# Patient Record
Sex: MALE | Race: BLACK OR AFRICAN AMERICAN | NOT HISPANIC OR LATINO
[De-identification: names, ages, dates, MRNs, and addresses within clinical notes are randomized per-mention and may not be internally consistent; named-entity substitution may affect disease eponyms.]

---
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
## 2025-02-03 PROBLEM — Z01.812 ENCOUNTER FOR PREOPERATIVE SCREENING LABORATORY TESTING FOR COVID-19 VIRUS: Status: RESOLVED | Noted: 2024-02-05 | Resolved: 2025-02-03

## 2025-02-03 PROBLEM — Z11.52 ENCOUNTER FOR PREOPERATIVE SCREENING LABORATORY TESTING FOR COVID-19 VIRUS: Status: RESOLVED | Noted: 2024-02-05 | Resolved: 2025-02-03

## 2025-02-03 PROBLEM — L44.2 LICHEN STRIATUS: Status: RESOLVED | Noted: 2019-08-15 | Resolved: 2025-02-03

## 2025-02-03 PROBLEM — L42 PITYRIASIS ROSEA: Status: RESOLVED | Noted: 2020-02-18 | Resolved: 2025-02-03

## 2025-02-05 ENCOUNTER — MED REC SCAN ONLY (OUTPATIENT)
Dept: FAMILY MEDICINE CLINIC | Facility: CLINIC | Age: 20
End: 2025-02-05

## 2025-02-05 ENCOUNTER — OFFICE VISIT (OUTPATIENT)
Dept: FAMILY MEDICINE CLINIC | Facility: CLINIC | Age: 20
End: 2025-02-05
Payer: COMMERCIAL

## 2025-02-05 VITALS
HEIGHT: 69 IN | BODY MASS INDEX: 19.61 KG/M2 | SYSTOLIC BLOOD PRESSURE: 119 MMHG | WEIGHT: 132.38 LBS | DIASTOLIC BLOOD PRESSURE: 76 MMHG | HEART RATE: 66 BPM

## 2025-02-05 DIAGNOSIS — Z11.3 SCREEN FOR STD (SEXUALLY TRANSMITTED DISEASE): ICD-10-CM

## 2025-02-05 DIAGNOSIS — Z00.00 PHYSICAL EXAM: Primary | ICD-10-CM

## 2025-02-05 NOTE — PROGRESS NOTES
2/5/2025  9:16 AM    Satish Peters is a 19 year old male.    Chief complaint(s):   Chief Complaint   Patient presents with    Routine Physical     STD screening labs      HPI:     Satish Peters primary complaint is regarding as above.       Satish Peters is a 19 year old male is here for routine periodic health screening and examination.  His last physical exam was 2 years ago.  His last ECG was none . His last diabetes screening test was 2 years ago and was normal.   His last cholesterol test was 2 year ago and was normal.  Last dentist visit was 3 yrs ago.  He is current with his Td immunization.  He is current with his Flu vaccination.  Patient last colonoscopy was last year, due to abdominal pains. He is not a smoker.  Sex partner 3-4 in his life time.      HISTORY:  Past Medical History:    Acid reflux      Past Surgical History:   Procedure Laterality Date    Colonoscopy N/A 2/5/2024    Procedure: COLONOSCOPY with biopsy;  Surgeon: Dayton Loomis MD;  Location: [facility]      No family history on file.   Social History:   Social History     Socioeconomic History    Marital status: Single   Tobacco Use    Smoking status: Never    Smokeless tobacco: Never   Vaping Use    Vaping status: Never Used   Substance and Sexual Activity    Alcohol use: Never    Drug use: Never    Sexual activity: Yes     Social Drivers of Health     Food Insecurity: No Food Insecurity (2/5/2025)    NCSS - Food Insecurity     Worried About Running Out of Food in the Last Year: No     Ran Out of Food in the Last Year: No   Transportation Needs: No Transportation Needs (2/5/2025)    NCSS - Transportation     Lack of Transportation: No   Housing Stability: Not At Risk (2/5/2025)    NCSS - Housing/Utilities     Has Housing: Yes     Worried About Losing Housing: No     Unable to Get Utilities: No        Immunizations:   Immunization History   Administered Date(s) Administered    Adult 18-64 FLUZONE Intraderm, Influenza  Vaccine,(69676),Split Virus, Flu Clinic 10/20/2008    Covid-19 Vaccine Pfizer 30 mcg/0.3 ml 06/05/2021, 06/28/2021    DTAP 10/31/2005, 01/03/2006, 01/30/2006, 03/13/2006, 10/07/2009    DTAP/HEP B/IPV Combined 10/31/2005    Fluvirin, 3 Years & >, Im 09/30/2010    HEP A,Ped/Adol,(2 Dose) 08/28/2006, 08/28/2006, 03/13/2007, 03/13/2007, 06/05/2008    HEP B 08/14/2005, 10/31/2005, 10/28/2010    HEP B, Ped/Adol 08/13/2005, 03/13/2006, 10/28/2010    Hib, Unspecified Formulation 10/31/2005, 01/03/2006, 03/13/2006, 08/28/2006    Hpv Virus Vaccine 9 Bea Im 08/23/2016, 08/15/2019    IPV 10/31/2005, 01/03/2006, 03/13/2006, 10/07/2009    Influenza 03/13/2007, 10/25/2007, 10/20/2008    Influenza Virus Vaccine, Split 03/13/2007, 10/25/2007, 10/20/2008    MMR 08/28/2006, 10/07/2009    Meningococcal Vaccine 08/15/2022    Meningococcal-Menactra 08/23/2016    Meningococcal-Menveo 10-55 YEARS 08/15/2022    Meningococcal-Menveo 2month-55yr 08/15/2022    Pneumococcal (Prevnar 7) 10/31/2005, 01/03/2006, 03/13/2006, 08/28/2006    Pneumovax 23 10/31/2005, 01/03/2006, 08/28/2006    TDAP 08/23/2016    Tb Intradermal Test 01/31/2011    Varicella 08/08/2006, 08/28/2006, 10/07/2009       Medications (Active prior to today's visit):  Current Outpatient Medications   Medication Sig Dispense Refill    dicyclomine 20 MG Oral Tab Take 1 tablet (20 mg total) by mouth 4 (four) times daily as needed (Abdominal pain). 30 tablet 0    ondansetron (ZOFRAN) 4 mg tablet Take 1 tablet (4 mg total) by mouth every 8 (eight) hours as needed for Nausea. 30 tablet 0       Allergies:  Allergies[1]      ROS:   Review of Systems   Constitutional:  Negative for appetite change, fatigue and fever.   HENT:  Negative for hearing loss and nosebleeds.    Eyes:  Negative for pain and visual disturbance.   Respiratory:  Negative for apnea and shortness of breath.    Cardiovascular:  Negative for chest pain, palpitations and leg swelling.   Gastrointestinal:  Negative for  abdominal pain, blood in stool, constipation, diarrhea, nausea and vomiting.   Endocrine: Negative for polydipsia and polyuria.   Genitourinary:  Negative for decreased urine volume, frequency and hematuria.        No nocturia   Musculoskeletal:  Negative for arthralgias.   Skin:  Negative for rash.   Neurological:  Negative for dizziness, syncope and headaches.   Psychiatric/Behavioral:  Negative for dysphoric mood and sleep disturbance.        PHYSICAL EXAM:   VS: /76   Pulse 66   Ht 5' 9\" (1.753 m)   Wt 132 lb 6.4 oz (60.1 kg)   BMI 19.55 kg/m²     Physical Exam  Vitals reviewed.   Constitutional:       Appearance: Normal appearance.   Cardiovascular:      Rate and Rhythm: Normal rate and regular rhythm.      Heart sounds: No murmur heard.     No gallop.   Abdominal:      General: Bowel sounds are normal. There is no distension.      Palpations: Abdomen is soft. There is no mass.      Tenderness: There is no abdominal tenderness.      Hernia: There is no hernia in the left inguinal area or right inguinal area.   Genitourinary:     Penis: Normal.       Testes: Normal.      Prostate: Normal.      Rectum: Guaiac result negative.   Musculoskeletal:      Cervical back: Neck supple.      Comments: Spinal exam without scoliosis.      Skin:     Comments: No rash   Neurological:      Sensory: No sensory deficit.      Deep Tendon Reflexes:      Reflex Scores:       Patellar reflexes are 2+ on the right side and 2+ on the left side.     Comments: Alert, pleasant male.  No focal neurological dificits.  Normal gait and coordination.   Psychiatric:      Comments: Appropriate affect and misdemeanor.         LABORATORY RESULTS:   No results found for: \"URCOLOR\", \"URCLA\", \"URINELEUK\", \"URINENITRITE\", \"URINEBLOOD\"   Results for orders placed or performed during the hospital encounter of 08/11/24   Comp Metabolic Panel (14)    Collection Time: 08/11/24  8:53 PM   Result Value Ref Range    Glucose 96 70 - 99 mg/dL     Sodium 140 136 - 145 mmol/L    Potassium 4.7 3.5 - 5.1 mmol/L    Chloride 105 98 - 112 mmol/L    CO2 27.0 21.0 - 32.0 mmol/L    Anion Gap 8 0 - 18 mmol/L    BUN 11 9 - 23 mg/dL    Creatinine 1.38 (H) 0.50 - 1.00 mg/dL    BUN/CREA Ratio 8.0 (L) 10.0 - 20.0    Calcium, Total 9.4 8.7 - 10.4 mg/dL    Calculated Osmolality 289 275 - 295 mOsm/kg    eGFR-Cr 76 >=60 mL/min/1.73m2    ALT <7 (L) 10 - 49 U/L    AST 22 <34 U/L    Alkaline Phosphatase 82 52 - 222 U/L    Bilirubin, Total 1.6 (H) 0.3 - 1.2 mg/dL    Total Protein 7.2 5.7 - 8.2 g/dL    Albumin 4.4 3.2 - 4.8 g/dL    Globulin  2.8 2.0 - 3.5 g/dL    A/G Ratio 1.6 1.0 - 2.0   CBC With Differential With Platelet    Collection Time: 08/11/24  8:53 PM   Result Value Ref Range    WBC 6.9 4.0 - 11.0 x10(3) uL    RBC 5.57 4.30 - 5.70 x10(6)uL    HGB 14.1 13.0 - 17.5 g/dL    HCT 42.3 39.0 - 53.0 %    MCV 75.9 (L) 80.0 - 100.0 fL    MCH 25.3 (L) 26.0 - 34.0 pg    MCHC 33.3 31.0 - 37.0 g/dL    RDW-SD 39.8 35.1 - 46.3 fL    RDW 14.6 11.0 - 15.0 %    .0 150.0 - 450.0 10(3)uL    Neutrophil Absolute Prelim 3.75 1.50 - 7.70 x10 (3) uL    Neutrophil Absolute 3.75 1.50 - 7.70 x10(3) uL    Lymphocyte Absolute 1.72 1.50 - 5.00 x10(3) uL    Monocyte Absolute 1.32 (H) 0.10 - 1.00 x10(3) uL    Eosinophil Absolute 0.07 0.00 - 0.70 x10(3) uL    Basophil Absolute 0.05 0.00 - 0.20 x10(3) uL    Immature Granulocyte Absolute 0.02 0.00 - 1.00 x10(3) uL    Neutrophil % 54.2 %    Lymphocyte % 24.8 %    Monocyte % 19.0 %    Eosinophil % 1.0 %    Basophil % 0.7 %    Immature Granulocyte % 0.3 %   Lipase    Collection Time: 08/11/24  8:53 PM   Result Value Ref Range    Lipase 38 12 - 53 U/L   RAINBOW DRAW LAVENDER    Collection Time: 08/11/24  8:53 PM   Result Value Ref Range    Hold Lavender Auto Resulted    POCT Rapid Strep    Collection Time: 08/11/24  9:20 PM   Result Value Ref Range    POCT Rapid Strep Negative Negative       EKG / Spirometry : -     Radiology: No results found.      ASSESSMENT/PLAN:   Assessment   Encounter Diagnoses   Name Primary?    Physical exam Yes    Screen for STD (sexually transmitted disease)          CPE PLAN:    LABS / TEST & ORDERS for today's visit :Blood test(s) ordered today for send out to [facility] lab:  Orders Placed This Encounter   Procedures    CBC With Differential With Platelet    Comp Metabolic Panel (14)    Hemoglobin A1C    Lipid Panel    TSH W Reflex To Free T4    Vitamin D    Urinalysis with Culture Reflex    HIV Ag/Ab Combo    T Pallidum Screening Cascade    HSV 1 & 2 Glycoprotein Specific AB,IGG    Chlamydia/Gc Amplification    In-House; Urine dip.  Test/Procedures done today include: EKG.    IMMUNIZATIONS: none, given today.    RECOMMENDATIONS given include: ANTICIPATORY GUIDANCE  topics covered today include: safety (i.e. seat belts, helmets, sunscreen, protective sports gear ), nutrition (i.e. healthy meals and snacks (i.e. avoid junk food and high-carbohydrate foods); athletic conditioning, fluids; low fat milk, limit to less than 20 oz. a day; dental care ), and Healthy habits& Social competence & Responsibilities: Recommendations on physical activity; exercise daily or at least 3 times a week for 30-60 minutes doing cardiovascular exercise. Encourage to maintain the best physical and dental hygiene possible.  Consider a  if overweight and/or having difficult in staying active; attempt to keep a schedule that includes an adequate sleep and physical exercise / activities Patient educated on doing regular self testicular exam. Patient was educated on sexual transmitted disease. Best to abstain from sexual intercourse until he is ready to form a family. Use of condoms may prevent transmission of infections as well as pregnancy.    FOLLOW-UP: Schedule a follow-up visit in 12 months.          Orders This Visit:  Orders Placed This Encounter   Procedures    CBC With Differential With Platelet    Comp Metabolic Panel (14)     Hemoglobin A1C    Lipid Panel    TSH W Reflex To Free T4    Vitamin D    Urinalysis with Culture Reflex    HIV Ag/Ab Combo    T Pallidum Screening Cascade    HSV 1 & 2 Glycoprotein Specific AB,IGG    Chlamydia/Gc Amplification       Meds This Visit:  Requested Prescriptions      No prescriptions requested or ordered in this encounter       Imaging & Referrals:  None         ABEL ALCOCER MD       [1] No Known Allergies

## 2025-02-28 ENCOUNTER — PATIENT MESSAGE (OUTPATIENT)
Facility: CLINIC | Age: 20
End: 2025-02-28

## 2025-02-28 DIAGNOSIS — Z00.00 HEALTHCARE MAINTENANCE: Primary | ICD-10-CM

## 2025-04-30 ENCOUNTER — TELEPHONE (OUTPATIENT)
Facility: CLINIC | Age: 20
End: 2025-04-30

## 2025-04-30 NOTE — TELEPHONE ENCOUNTER
1st,overdue reminder letter mailed out to patient   Labs order   Orders Placed on 3/5/2025    CBC, Platelet; No Differential

## 2025-05-07 ENCOUNTER — PATIENT MESSAGE (OUTPATIENT)
Facility: CLINIC | Age: 20
End: 2025-05-07

## 2025-05-07 DIAGNOSIS — R10.84 GENERALIZED ABDOMINAL PAIN: ICD-10-CM

## 2025-05-11 ENCOUNTER — PATIENT MESSAGE (OUTPATIENT)
Facility: CLINIC | Age: 20
End: 2025-05-11

## 2025-05-11 DIAGNOSIS — R10.84 GENERALIZED ABDOMINAL PAIN: ICD-10-CM

## 2025-05-12 DIAGNOSIS — R10.84 GENERALIZED ABDOMINAL PAIN: ICD-10-CM

## 2025-05-12 RX ORDER — DICYCLOMINE HCL 20 MG
20 TABLET ORAL 4 TIMES DAILY PRN
Qty: 30 TABLET | Refills: 0 | OUTPATIENT
Start: 2025-05-12

## 2025-05-12 RX ORDER — DICYCLOMINE HCL 20 MG
20 TABLET ORAL 4 TIMES DAILY PRN
Qty: 30 TABLET | Refills: 0 | Status: SHIPPED | OUTPATIENT
Start: 2025-05-12

## 2025-05-12 NOTE — TELEPHONE ENCOUNTER
Requested Prescriptions     Pending Prescriptions Disp Refills    dicyclomine 20 MG Oral Tab 30 tablet 0     Sig: Take 1 tablet (20 mg total) by mouth 4 (four) times daily as needed (Abdominal pain).         LOV 11/14/2023  Last Colonoscopy done 2/05/2024  LR  6/11/2024  Upcoming appt 5/15/2025 w/ Kacie     Patient aware that medication has been refilled       Disp Refills Start End    dicyclomine 20 MG Oral Tab 30 tablet 0 5/12/2025 --    Sig - Route: Take 1 tablet (20 mg total) by mouth 4 (four) times daily as needed (Abdominal pain). - Oral    Sent to pharmacy as: Dicyclomine HCl 20 MG Oral Tablet (Bentyl)    E-Prescribing Status: Receipt confirmed by pharmacy (5/12/2025  7:06 AM CDT)      Associated Diagnoses    Generalized abdominal pain        Pharmacy    [id number] [place], [place] - [address] [facility], [phone], [phone]

## 2025-05-12 NOTE — TELEPHONE ENCOUNTER
Disp Refills Start End    dicyclomine 20 MG Oral Tab 30 tablet 0 5/12/2025 --    Sig - Route: Take 1 tablet (20 mg total) by mouth 4 (four) times daily as needed (Abdominal pain). - Oral    Sent to pharmacy as: Dicyclomine HCl 20 MG Oral Tablet (Bentyl)    E-Prescribing Status: Receipt confirmed by pharmacy (5/12/2025  7:06 AM CDT)        Pharmacy    [id number] - [place], [place] - [address] [facility] [address] [facility], [phone], [phone]

## 2025-05-15 ENCOUNTER — OFFICE VISIT (OUTPATIENT)
Facility: CLINIC | Age: 20
End: 2025-05-15
Payer: COMMERCIAL

## 2025-05-15 ENCOUNTER — LAB ENCOUNTER (OUTPATIENT)
Dept: LAB | Facility: HOSPITAL | Age: 20
End: 2025-05-15
Payer: COMMERCIAL

## 2025-05-15 VITALS
WEIGHT: 125 LBS | HEART RATE: 58 BPM | BODY MASS INDEX: 18.51 KG/M2 | DIASTOLIC BLOOD PRESSURE: 79 MMHG | SYSTOLIC BLOOD PRESSURE: 117 MMHG | HEIGHT: 69 IN

## 2025-05-15 DIAGNOSIS — R10.33 PERIUMBILICAL ABDOMINAL PAIN: ICD-10-CM

## 2025-05-15 DIAGNOSIS — R10.10 UPPER ABDOMINAL PAIN: ICD-10-CM

## 2025-05-15 DIAGNOSIS — K21.9 GASTROESOPHAGEAL REFLUX DISEASE, UNSPECIFIED WHETHER ESOPHAGITIS PRESENT: Primary | ICD-10-CM

## 2025-05-15 DIAGNOSIS — K21.9 GASTROESOPHAGEAL REFLUX DISEASE, UNSPECIFIED WHETHER ESOPHAGITIS PRESENT: ICD-10-CM

## 2025-05-15 LAB — IGA SERPL-MCNC: 169.1 MG/DL (ref 40–350)

## 2025-05-15 PROCEDURE — 99214 OFFICE O/P EST MOD 30 MIN: CPT

## 2025-05-15 PROCEDURE — 83013 H PYLORI (C-13) BREATH: CPT

## 2025-05-15 PROCEDURE — 3078F DIAST BP <80 MM HG: CPT

## 2025-05-15 PROCEDURE — 3074F SYST BP LT 130 MM HG: CPT

## 2025-05-15 PROCEDURE — 82784 ASSAY IGA/IGD/IGG/IGM EACH: CPT

## 2025-05-15 PROCEDURE — 36415 COLL VENOUS BLD VENIPUNCTURE: CPT

## 2025-05-15 PROCEDURE — 3008F BODY MASS INDEX DOCD: CPT

## 2025-05-15 PROCEDURE — 86364 TISS TRNSGLTMNASE EA IG CLAS: CPT

## 2025-05-15 NOTE — H&P
[facility] - Gastroenterology                                                                                                               Reason for consult: abd pain    Requesting physician or provider: Sanford Li MD    Chief Complaint   Patient presents with    Abdominal Pain       HPI:   Satish Peters is a 19 year old year-old male with active diagnoses including GERD. Prior medical/surgical history in note table.    he is here today for evaluation. Over the past year he has been feeling well.   #abdominal pain   #dyspepsia  #GERD  -reports episodes of periumbilical abdominal pain & acid reflux triggered by \"junk food\" such as fried foods, greasy foods, excess dairy. Occasional pain in RUQ.   -usually episodes last about 2 days and treated with bland diet, tums and dicyclomine. Most recent episode 1.5 weeks ago and associated with nausea, lasted about 1 week which was abnormal for him.   -diet recall: breakfast: oatmeal, nature valley bar, water // lunch: sandwiches, or leftovers, sometimes take out // dinner: 50/50 homemade or take out. // snack: banana, strawberries // drinks: mostly just water. Does not eat much veggies. Has cut back on juice  -baseline bowel habits usually twice daily, formed, soft to firm. Sometimes wakes up in the morning with urgency. Infrequent loose stool     Patient denies symptoms of vomiting, dysphagia, odynophagia, globus sensation, hematemesis, change in bowel habits, constipation, diarrhea, hematochezia, or melena. he denies recent change in appetite, fever or unintentional weight loss.      Last colonoscopy:  2/5/24 Dr. Loomis  No polyps.  Normal appearing terminal ileum. Biopsied.  Normal appearing colon with glistening mucosa and intact vascular pattern. Biopsied.  Small internal hemorrhoids.  I suspect prior episodes of loose stools was related to a viral gastroenteritis.  Intermittent episodes of blood may be related to hemorrhoids. No evidence of inflammatory bowel disease seen on today's exam.    A. Terminal ileum; biopsy:  Fragments of small bowel mucosa with lamina propria prominent benign lymphoid aggregate.  Normal villous architecture present.  No evidence of acute cryptitis, granuloma, dysplasia, or malignancy is identified.     B. Random colon; biopsy:   Multiple fragments of colonic mucosa with mild nonspecific chronic inflammation, and lamina propria benign lymphoid aggregates.  No evidence of microscopic colitis, significant glandular distortion, acute cryptitis, granuloma, dysplasia, or malignancy is identified.    Last EGD: none     NSAIDS: none   Tobacco: none   Alcohol: none   Marijuana: none   Illicit drugs: none     FH GI malignancy: none   FH IBD: Mom-crohns    No history of adverse reaction to sedation  No SYEDA  No anticoagulants/antiplatelet  No pacemaker/defibrillator    Wt Readings from Last 6 Encounters:   05/15/25 125 lb (56.7 kg) (7%, Z= -1.49)*   02/05/25 132 lb 6.4 oz (60.1 kg) (15%, Z= -1.02)*   08/11/24 130 lb 1.1 oz (59 kg) (14%, Z= -1.07)*   04/24/24 136 lb (61.7 kg) (24%, Z= -0.70)*   02/05/24 131 lb (59.4 kg) (18%, Z= -0.92)*   11/14/23 128 lb (58.1 kg) (15%, Z= -1.04)*     * Growth percentiles are based on [facility] (Boys, 2-20 Years) data.        History, Medications, Allergies, ROS:      Past Medical History[1]   Past Surgical History[2]   Family Hx: Family History[3]   Social History: Short Social Hx on File[4]     Medications (Active prior to today's visit):  Current Medications[5]    Allergies:  Allergies[6]    ROS:   CONSTITUTIONAL: negative for fevers, chills, sweats  EYES Negative for scleral icterus or redness, and diplopia  HEENT: Negative for hoarseness  RESPIRATORY: Negative for cough and severe shortness of breath  CARDIOVASCULAR: Negative for crushing sub-sternal chest pain  GASTROINTESTINAL: See HPI  GENITOURINARY: Negative for  dysuria  MUSCULOSKELETAL: Negative for arthralgias and myalgias  SKIN: Negative for jaundice, rash or pruritus  NEUROLOGICAL: Negative for dizziness and headaches  BEHAVIOR/PSYCH: Negative for psychotic behavior    PHYSICAL EXAM:   Blood pressure 117/79, pulse 58, height 5' 9\" (1.753 m), weight 125 lb (56.7 kg).    GEN: Alert, no acute distress, well-nourished   HEENT: anicteric sclera, neck supple, trachea midline, MMM, no palpable or tender neck or supraclavicular lymph nodes  CV: RRR, the extremities are warm and well perfused   LUNGS: No increased work of breathing, CTAB  ABDOMEN: Soft, symmetrical, non-tender without distention or guarding. No scars or lesions. Aorta is without bruit or visible pulsation. Umbilicus is midline without herniation. Normoactive bowel sounds are present, No masses, hepatomegaly or splenomegaly noted.  MSK: No erythema, no warmth, no swelling of joints  SKIN: No jaundice, no erythema, no rashes, no lesions  HEMATOLOGIC: No bleeding, no bruising  NEURO: Alert and interactive, ESCOBAR  PSYCH: appropriate mood & affect    Labs/Imaging/Procedures:     Patient's pertinent labs and imaging were reviewed and discussed with patient today.        .  ASSESSMENT/PLAN:   Satish Peters is a 19 year old year-old male with active diagnoses including GERD. Prior medical/surgical history in note table.    he is here today for evaluation. Over the past year he has been feeling well.   #abdominal pain   #dyspepsia  #GERD  -reports episodes of periumbilical abdominal pain & acid reflux triggered by \"junk food\" such as fried foods, greasy foods, excess dairy. Occasional pain in RUQ.   -usually episodes last about 2 days and treated with bland diet, tums and dicyclomine. Most recent episode 1.5 weeks ago and associated with nausea, lasted about 1 week which was abnormal for him.   -diet recall: breakfast: oatmeal, nature [place] bar, water // lunch: sandwiches, or leftovers, sometimes take out // dinner: 50/50  homemade or take out. // snack: banana, strawberries // drinks: mostly just water. Does not eat much veggies. Has cut back on juice  -baseline bowel habits usually twice daily, formed, soft to firm. Sometimes wakes up in the morning with urgency. Infrequent loose stool   -colonoscopy in 2024 negative for IBD    -symptoms likely GERD related triggered by diet. Advised on high fiber diet and avoiding GERD triggering foods. Recommend US to assess gallbladder. Will test for H. Pylori & celiac serology. He denies weight loss but reports inability to gain weight. CBC last year normal hemoglobin but low MCV & MCH. If H. Pylori negative will start PPI trial     Recommendations:  -go to lab H. Pylori breath test & celiac serology     -US gallbladder    -diet high in fiber    -avoid GERD triggering foods and eating late    -check with your insurance about dietician options        Orders This Visit:  Orders Placed This Encounter   Procedures    Immunoglobulin A, Quant    Tissue Transglutaminase Ab, IgA    Helicobacter Pylori Breath Test       Meds This Visit:  Requested Prescriptions      No prescriptions requested or ordered in this encounter       Imaging & Referrals:  US GALLBLADDER (ADX=[id number])      JOSELO Pina    [facility]  5/15/2025        This note was partially prepared using Dragon Medical voice recognition dictation software. As a result, errors may occur. When identified, these errors have been corrected. While every attempt is made to correct errors during dictation, discrepancies may still exist.          [1]   Past Medical History:   Acid reflux   [2]   Past Surgical History:  Procedure Laterality Date    Colonoscopy N/A 2/5/2024    Procedure: COLONOSCOPY with biopsy;  Surgeon: Dayton Loomis MD;  Location: [facility]   [3]   Family History  Problem Relation Age of Onset    Crohn's Disease Mother    [4]   Social History  Socioeconomic History    Marital status: Single   Tobacco  Use    Smoking status: Never    Smokeless tobacco: Never   Vaping Use    Vaping status: Never Used   Substance and Sexual Activity    Alcohol use: Never    Drug use: Never    Sexual activity: Yes     Social Drivers of Health     Food Insecurity: No Food Insecurity (2/5/2025)    NCSS - Food Insecurity     Worried About Running Out of Food in the Last Year: No     Ran Out of Food in the Last Year: No   Transportation Needs: No Transportation Needs (2/5/2025)    NCSS - Transportation     Lack of Transportation: No   Housing Stability: Not At Risk (2/5/2025)    NCSS - Housing/Utilities     Has Housing: Yes     Worried About Losing Housing: No     Unable to Get Utilities: No   [5]   Current Outpatient Medications   Medication Sig Dispense Refill    dicyclomine 20 MG Oral Tab Take 1 tablet (20 mg total) by mouth 4 (four) times daily as needed (Abdominal pain). 30 tablet 0    ondansetron (ZOFRAN) 4 mg tablet Take 1 tablet (4 mg total) by mouth every 8 (eight) hours as needed for Nausea. 30 tablet 0   [6] No Known Allergies

## 2025-05-16 LAB
H PYLORI BREATH TEST: NEGATIVE
TTG IGA SER-ACNC: <0.2 U/ML (ref ?–7)

## 2025-06-26 ENCOUNTER — HOSPITAL ENCOUNTER (EMERGENCY)
Facility: HOSPITAL | Age: 20
Discharge: LEFT BEFORE DISCHARGE | End: 2025-06-26
Payer: COMMERCIAL

## 2025-06-26 VITALS
HEART RATE: 64 BPM | HEIGHT: 69 IN | SYSTOLIC BLOOD PRESSURE: 114 MMHG | DIASTOLIC BLOOD PRESSURE: 66 MMHG | OXYGEN SATURATION: 99 % | RESPIRATION RATE: 14 BRPM | WEIGHT: 130 LBS | BODY MASS INDEX: 19.26 KG/M2 | TEMPERATURE: 98 F

## 2025-06-26 NOTE — ED INITIAL ASSESSMENT (HPI)
States \"my acid reflux has been acting up\"- relief with TUMS.  C/o N/V. Denies fever, diarrhea, abd pain. Pt A&Ox4, ambulatory.

## (undated) DEVICE — CANNULA NASAL ADULT PIGTAIL L7

## (undated) DEVICE — FORCEPS BX L240CM DIA2.4MM L NDL RAD JAW 4

## (undated) DEVICE — KIT CLEAN ENDOKIT 1.1OZ GOWNX2

## (undated) DEVICE — KIT ENDO ORCAPOD 160/180/190

## (undated) DEVICE — SYRINGE REGULAR TIP 60ML

## (undated) DEVICE — TUBING SCT CLR 6FT .25IN MDVC

## (undated) NOTE — LETTER
4/30/2025          Satish Peters    [address] [address]         Dear Satish,    Our records indicate that the tests ordered for you by Dayton Loomis MD  have not been done.  If you have, in fact, already completed the tests or you do not wish to have the tests done, please contact our office at THE NUMBER LISTED BELOW.  Otherwise, please proceed with the testing.  Enclosed is a duplicate order for your convenience.  Labs order   Orders Placed on 3/5/2025            CBC, Platelet; No Differential    To schedule a test at any [facility], call Central Scheduling at [phone], Monday through Friday between 7:30am to 6pm and on Saturday between 8am and 1pm.   Evening and weekend appointments for your exam are available.       Sincerely,    Dayton Loomis MD  [facility]  [address]  [place] [address] [phone]

## (undated) NOTE — LETTER
[facility]  Administration of Anesthesia  ISatish agree to be cared for by a physician anesthesiologist alone and/or with a nurse anesthetist, who is specially trained to monitor me and give me medicine to put me to sleep or keep me comfortable during my procedure    I understand that my anesthesiologist and/or anesthetist is not an employee or agent of [facility] or [employer]. He or she works for [facility]    As the patient asking for anesthesia services, I agree to:  Allow the anesthesiologist (anesthesia doctor) to give me medicine and do additional procedures as necessary. Some examples are: Starting or using an “IV” to give me medicine, fluids or blood during my procedure, and having a breathing tube placed to help me breathe when I’m asleep (intubation). In the event that my heart stops working properly, I understand that my anesthesiologist will make every effort to sustain my life, unless otherwise directed by [facility] Do Not Resuscitate documents.  Tell my anesthesia doctor before my procedure:  If I am pregnant.  The last time that I ate or drank.  iii. All of the medicines I take (including prescriptions, herbal supplements, and pills I can buy without a prescription (including street drugs/illegal medications). Failure to inform my anesthesiologist about these medicines may increase my risk of anesthetic complications.  iv.If I am allergic to anything or have had a reaction to anesthesia before.  I understand how the anesthesia medicine will help me (benefits).  I understand that with any type of anesthesia medicine there are risks:  The most common risks are: nausea, vomiting, sore throat, muscle soreness, damage to my eyes, mouth, or teeth (from breathing tube placement).  Rare risks include: remembering what happened during my procedure, allergic reactions to medications, injury to my airway, heart, lungs, vision, nerves, or  muscles and in extremely rare instances death.  My doctor has explained to me other choices available to me for my care (alternatives).  Pregnant Patients (“epidural”):  I understand that the risks of having an epidural (medicine given into my back to help control pain during labor), include itching, low blood pressure, difficulty urinating, headache or slowing of the baby’s heart. Very rare risks include infection, bleeding, seizure, irregular heart rhythms and nerve injury.  Regional Anesthesia (“spinal”, “epidural”, & “nerve blocks”):  I understand that rare but potential complications include headache, bleeding, infection, seizure, irregular heart rhythms, and nerve injury.    _____________________________________________________________________________  Patient (or Representative) Signature/Relationship to Patient  Date   Time    _____________________________________________________________________________   Name (if used)    Language/Organization   Time    _____________________________________________________________________________  Nurse Anesthetist Signature     Date   Time  _____________________________________________________________________________  Anesthesiologist Signature     Date   Time  I have discussed the procedure and information above with the patient (or patient’s representative) and answered their questions. The patient or their representative has agreed to have anesthesia services.    _____________________________________________________________________________  Witness        Date   Time  I have verified that the signature is that of the patient or patient’s representative, and that it was signed before the procedure  Patient Name: Satish Peters     : 2005                 Printed: 2024 at 8:37 AM    Medical Record #: [id number]                                            Page 1 of 1  ----------ANESTHESIA CONSENT----------

## (undated) NOTE — LETTER
Date & Time: 4/24/2024, 8:12 PM  Patient: Satish Peters  Encounter Provider(s):    Nunu Jang MD       To Whom It May Concern:    Satish Peters was seen and treated in our department on 4/24/2024. He should not participate in gym/sports until seen and evaluated by Podiatrist .    If you have any questions or concerns, please do not hesitate to call.        _____________________________  Physician/APC Signature

## (undated) NOTE — LETTER
[facility]  [address], [place], [place]  Authorization for Surgical Operation and Procedure                                                                                           I hereby authorize Dayton Loomis MD, my physician and his/her assistants (if applicable), which may include medical students, residents, and/or fellows, to perform the following surgical operation/ procedure and administer such anesthesia as may be determined necessary by my physician: Operation/Procedure name (s) COLONOSCOPY on Satish Peters   2.   I recognize that during the surgical operation/procedure, unforeseen conditions may necessitate additional or different procedures than those listed above.  I, therefore, further authorize and request that the above-named surgeon, assistants, or designees perform such procedures as are, in their judgment, necessary and desirable.    3.   My surgeon/physician has discussed prior to my surgery the potential benefits, risks and side effects of this procedure; the likelihood of achieving goals; and potential problems that might occur during recuperation.  They also discussed reasonable alternatives to the procedure, including risks, benefits, and side effects related to the alternatives and risks related to not receiving this procedure.  I have had all my questions answered and I acknowledge that no guarantee has been made as to the result that may be obtained.    4.   Should the need arise during my operation/procedure, which includes change of level of care prior to discharge, I also consent to the administration of blood and/or blood products.  Further, I understand that despite careful testing and screening of blood or blood products by collecting agencies, I may still be subject to ill effects as a result of receiving a blood transfusion and/or blood products.  The following are some, but not all, of the potential risks that can occur: fever and allergic  reactions, hemolytic reactions, transmission of diseases such as Hepatitis, AIDS and Cytomegalovirus (CMV) and fluid overload.  In the event that I wish to have an autologous transfusion of my own blood, or a directed donor transfusion, I will discuss this with my physician.  Check only if Refusing Blood or Blood Products  I understand refusal of blood or blood products as deemed necessary by my physician may have serious consequences to my condition to include possible death. I hereby assume responsibility for my refusal and release the hospital, its personnel, and my physicians from any responsibility for the consequences of my refusal.    o  Refuse   5.   I authorize the use of any specimen, organs, tissues, body parts or foreign objects that may be removed from my body during the operation/procedure for diagnosis, research or teaching purposes and their subsequent disposal by hospital authorities.  I also authorize the release of specimen test results and/or written reports to my treating physician on the hospital medical staff or other referring or consulting physicians involved in my care, at the discretion of the Pathologist or my treating physician.    6.   I consent to the photographing or videotaping of the operations or procedures to be performed, including appropriate portions of my body for medical, scientific, or educational purposes, provided my identity is not revealed by the pictures or by descriptive texts accompanying them.  If the procedure has been photographed/videotaped, the surgeon will obtain the original picture, image, videotape or CD.  The hospital will not be responsible for storage, release or maintenance of the picture, image, tape or CD.    7.   I consent to the presence of a  or observers in the operating room as deemed necessary by my physician or their designees.    8.   I recognize that in the event my procedure results in extended X-Ray/fluoroscopy time, I may  develop a skin reaction.    9. If I have a Do Not Attempt Resuscitation (DNAR) order in place, that status will be suspended while in the operating room, procedural suite, and during the recovery period unless otherwise explicitly stated by me (or a person authorized to consent on my behalf). The surgeon or my attending physician will determine when the applicable recovery period ends for purposes of reinstating the DNAR order.  10. Patients having a sterilization procedure: I understand that if the procedure is successful the results will be permanent and it will therefore be impossible for me to inseminate, conceive, or bear children.  I also understand that the procedure is intended to result in sterility, although the result has not been guaranteed.   11. I acknowledge that my physician has explained sedation/analgesia administration to me including the risk and benefits I consent to the administration of sedation/analgesia as may be necessary or desirable in the judgment of my physician.    I CERTIFY THAT I HAVE READ AND FULLY UNDERSTAND THE ABOVE CONSENT TO OPERATION and/or OTHER PROCEDURE.     _________________________________________ _________________________________     ___________________________________  Signature of Patient     Signature of Responsible Person                   Printed Name of Responsible Person                              _________________________________________ ______________________________        ___________________________________  Signature of Witness         Date  Time         Relationship to Patient    STATEMENT OF PHYSICIAN My signature below affirms that prior to the time of the procedure; I have explained to the patient and/or his/her legal representative, the risks and benefits involved in the proposed treatment and any reasonable alternative to the proposed treatment. I have also explained the risks and benefits involved in refusal of the proposed treatment and alternatives  to the proposed treatment and have answered the patient's questions. If I have a significant financial interest in a co-management agreement or a significant financial interest in any product or implant, or other significant relationship used in this procedure/surgery, I have disclosed this and had a discussion with my patient.     _______________________________________________________________ _____________________________  (Signature of Physician)                                                                                         (Date)                                   (Time)  Patient Name: Satish Peters    : 2005   Printed: 2024      Medical Record #: [id number]                                              Page 1 of 1

## (undated) NOTE — LETTER
2/2/2024              Satish Peters        [address] [address]         Dear Satish,    This letter is to inform you that our office has made several attempts to reach you by phone without success.  We were attempting to contact you by phone regarding your updated insurance information.     Please contact our office at the number listed below as soon as you receive this letter to discuss this issue and to make the necessary changes in our system to your contact information.  Thank you for your cooperation.        Sincerely,    Dayton Loomis MD  [address]  [facility] [address]  [phone]

## (undated) NOTE — LETTER
Date & Time: 4/24/2024, 8:14 PM  Patient: Satish Peters  Encounter Provider(s):    Nunu Jang MD       To Whom It May Concern:    Satish Peters was seen and treated in our department on 4/24/2024. He should not return to work until seen and evaluated by Podiatrist .    If you have any questions or concerns, please do not hesitate to call.        _____________________________  Physician/APC Signature

## (undated) NOTE — LETTER
Date & Time: 4/24/2024, 8:05 PM  Patient: Satish Peters  Encounter Provider(s):    Nunu Jang MD       To Whom It May Concern:    Satish Peters was seen and treated in our department on 4/24/2024. He should not participate in gym/sports until seen and evaluated by Ortho Specialist .    If you have any questions or concerns, please do not hesitate to call.        _____________________________  Physician/APC Signature